# Patient Record
Sex: FEMALE | Race: ASIAN | NOT HISPANIC OR LATINO | Employment: FULL TIME | URBAN - METROPOLITAN AREA
[De-identification: names, ages, dates, MRNs, and addresses within clinical notes are randomized per-mention and may not be internally consistent; named-entity substitution may affect disease eponyms.]

---

## 2019-12-21 ENCOUNTER — HOSPITAL ENCOUNTER (EMERGENCY)
Facility: HOSPITAL | Age: 24
Discharge: HOME | End: 2019-12-22
Attending: EMERGENCY MEDICINE

## 2019-12-21 ENCOUNTER — APPOINTMENT (EMERGENCY)
Dept: RADIOLOGY | Facility: HOSPITAL | Age: 24
End: 2019-12-21

## 2019-12-21 VITALS
RESPIRATION RATE: 16 BRPM | SYSTOLIC BLOOD PRESSURE: 142 MMHG | OXYGEN SATURATION: 98 % | HEART RATE: 95 BPM | TEMPERATURE: 99 F | DIASTOLIC BLOOD PRESSURE: 82 MMHG

## 2019-12-21 DIAGNOSIS — M25.561 ACUTE PAIN OF RIGHT KNEE: ICD-10-CM

## 2019-12-21 DIAGNOSIS — S83.91XA SPRAIN OF RIGHT KNEE, UNSPECIFIED LIGAMENT, INITIAL ENCOUNTER: Primary | ICD-10-CM

## 2019-12-21 PROCEDURE — 99283 EMERGENCY DEPT VISIT LOW MDM: CPT

## 2019-12-21 PROCEDURE — 73564 X-RAY EXAM KNEE 4 OR MORE: CPT

## 2019-12-21 PROCEDURE — 99283 EMERGENCY DEPT VISIT LOW MDM: CPT | Performed by: NURSE PRACTITIONER

## 2019-12-21 RX ORDER — IBUPROFEN 400 MG/1
400 TABLET ORAL ONCE
Status: COMPLETED | OUTPATIENT
Start: 2019-12-22 | End: 2019-12-21

## 2019-12-21 RX ADMIN — IBUPROFEN 400 MG: 400 TABLET ORAL at 23:50

## 2019-12-22 RX ORDER — IBUPROFEN 400 MG/1
400 TABLET ORAL EVERY 8 HOURS PRN
Qty: 30 TABLET | Refills: 0 | Status: SHIPPED | OUTPATIENT
Start: 2019-12-22 | End: 2020-01-01

## 2019-12-22 NOTE — ED PROVIDER NOTES
History  Chief Complaint   Patient presents with    Knee Injury     Pt reports was skiing and fell, reports right knee twisted and c/o pain  Pt noted to in immobilizer applied by   This is a 27year old female who was skiing in the [facility] around 8pm and states she fell and twisted her right knee and has pain  She states that the  placed her in a cardboard splint and was driven to the ED  Pt is from [place]  Pt states LMP 1 5 months ago and denies pregnancy  She states she is not sexually active and has irregular menses  History provided by:  Medical records and patient   used: No        None       History reviewed  No pertinent past medical history  History reviewed  No pertinent surgical history  History reviewed  No pertinent family history  I have reviewed and agree with the history as documented  Social History     Tobacco Use    Smoking status: Current Every Day Smoker    Smokeless tobacco: Never Used   Substance Use Topics    Alcohol use: Yes     Comment: ocass   Drug use: Never        Review of Systems   Musculoskeletal:        Right knee pain    All other systems reviewed and are negative  Physical Exam  Physical Exam   Constitutional: She is oriented to person, place, and time  She appears well-developed and well-nourished  No distress  HENT:   Head: Normocephalic and atraumatic  Eyes: Pupils are equal, round, and reactive to light  EOM are normal    Neck: Normal range of motion  Neck supple  Musculoskeletal: She exhibits tenderness  She exhibits no edema or deformity  LROM due to pain  No edema, deformity  Joint line tenderness - medial knee  Unable to flex due to pain  No laxity  Neurological: She is alert and oriented to person, place, and time  Skin: Skin is warm and dry  Capillary refill takes less than 2 seconds  She is not diaphoretic  Psychiatric: She has a normal mood and affect   Her behavior is normal  Judgment and thought content normal    Nursing note and vitals reviewed  Vital Signs  ED Triage Vitals   Temperature Pulse Respirations Blood Pressure SpO2   12/21/19 2305 12/21/19 2307 12/21/19 2307 12/21/19 2307 12/21/19 2307   99 °F (37 2 °C) 95 16 142/82 98 %      Temp Source Heart Rate Source Patient Position - Orthostatic VS BP Location FiO2 (%)   12/21/19 2305 12/21/19 2307 12/21/19 2307 12/21/19 2307 --   Temporal Monitor Sitting Right arm       Pain Score       12/21/19 2307       7           Vitals:    12/21/19 2307   BP: 142/82   Pulse: 95   Patient Position - Orthostatic VS: Sitting         Visual Acuity      ED Medications  Medications   ibuprofen (MOTRIN) tablet 400 mg (400 mg Oral Given 12/21/19 2350)       Diagnostic Studies  Results Reviewed     None                 XR knee 4+ views Right injury   ED Interpretation by ANASTASIIA Moralez (12/22 0619)   Preliminary reading   No acute osseous abnormality   ? Patellar effusion   Waiting on rad read                  Procedures  Orthopedic injury treatment  Date/Time: 12/22/2019 12:50 AM  Performed by: ANASTASIIA Moralez  Authorized by: ANASTASIIA Moralez     Patient Location:  ED  Other Assisting Provider: Yes (comment) (Nehemiah Harden RN )    Verbal consent obtained?: Yes    Written consent obtained?: Yes    Emergent situation    Risks and benefits: Risks, benefits and alternatives were discussed    Consent given by:  Patient  Patient states understanding of procedure being performed: Yes    Patient's understanding of procedure matches consent: Yes    Procedure consent matches procedure scheduled: Yes    Relevant documents present and verified: Yes    Test results available and properly labeled: Yes    Site marked: Yes    Radiology Images displayed and confirmed   If images not available, report reviewed: Yes    Required items: Required blood products, implants, devices and special equipment available    Patient identity confirmed:  Verbally with patient, arm band and hospital-assigned identification number  Time out: Immediately prior to the procedure a time out was called    Injury location:  Knee  Location details:  Right knee  Injury type: Soft tissue  Neurovascular status: Neurovascularly intact    Distal perfusion: normal    Neurological function: normal    Range of motion: reduced    Immobilization:  Ace wrap and knee immobilizer  Neurovascular status: Neurovascularly intact    Distal perfusion: normal    Neurological function: normal    Range of motion: unchanged    Patient tolerance:  Patient tolerated the procedure well with no immediate complications             ED Course  ED Course as of Dec 22 0123   Sun Dec 22, 2019   0029 Preliminary read xray negative osseous abnormality ? Patellar effusion  Will place in knee immobilizer with ace wrap, crutches, motrin and have pt follow up at home                                     MDM  Number of Diagnoses or Management Options  Diagnosis management comments: Pain right medial knee    Plan  Xray  Motrin  Concern that this could be meniscal tear  Knee immobilizer  Crutches  Follow up with ortho in [place]         Amount and/or Complexity of Data Reviewed  Tests in the radiology section of CPT®: ordered and reviewed  Review and summarize past medical records: yes          Disposition  Final diagnoses:   Sprain of right knee, unspecified ligament, initial encounter   Acute pain of right knee     Time reflects when diagnosis was documented in both MDM as applicable and the Disposition within this note     Time User Action Codes Description Comment    12/22/2019 12:30 AM Zena Lighter Add Baltazar Finical Sprain of right knee, unspecified ligament, initial encounter     12/22/2019 12:30 AM Zena Lighter Add [M25 [id number]] Acute pain of right knee       ED Disposition     ED Disposition Condition Date/Time Comment    Discharge Stable Sun Dec 22, 2019 12:30 AM Quinn \[facility]\"" discharge to home/self care  Follow-up Information     Follow up With Specialties Details Why [address] Emergency Medicine  If symptoms worsen [facility] [id number]  [phone] [place] ED, [address] [facility] [address]  , [place], [place], [address] [facility] Orthopedic Surgery Schedule an appointment as soon as possible for a visit in 2 days  [address]  [place] [address] [facility] [address]  [address] [place] [address], [address], [address] [place], [place], [id number] [phone]          Discharge Medication List as of 12/22/2019 12:33 AM      START taking these medications    Details   ibuprofen (MOTRIN) 400 mg tablet Take 1 tablet (400 mg total) by mouth every 8 (eight) hours as needed for mild pain or moderate pain for up to 10 days, Starting Sun 12/22/2019, Until Wed 1/1/2020, Print           No discharge procedures on file      ED Provider  Electronically Signed by           ANASTASIIA Crain  12/22/19 0652

## 2019-12-22 NOTE — DISCHARGE INSTRUCTIONS
Your xray did not reveal an acute bone abnormality  Radiology will read the xray and if it is abnormal, you will be notified  You have been placed in an ace wrap to prevent swelling and a knee immobilizer to keep your leg straight in the event you have a ligament tear     You are to take motrin and tylenol as needed for pain  Follow up with orthopedics Monday